# Patient Record
Sex: MALE | Race: WHITE | URBAN - METROPOLITAN AREA
[De-identification: names, ages, dates, MRNs, and addresses within clinical notes are randomized per-mention and may not be internally consistent; named-entity substitution may affect disease eponyms.]

---

## 2018-08-23 PROBLEM — G20 PARKINSON DISEASE (HCC): Status: ACTIVE | Noted: 2018-08-23

## 2019-02-26 ENCOUNTER — PATIENT OUTREACH (OUTPATIENT)
Dept: CASE MANAGEMENT | Age: 74
End: 2019-02-26

## 2019-02-26 NOTE — PROGRESS NOTES
Pt has a diagnoses of Parkinson's disease. Verbalized that he has been following up with his neurologist often. Upon visiting with his neurologist his HR was recorded in the low 40's. Pt had a pacemaker placed and is currently doing well. Lives at home with wife who is very involved in pts care. Son is a nurse at [employer] and is very involved in pts care. Pt had not seen his PCP in 7 months. Nurse educate on the importance of seeing PCP within 7 days after discharge. Nurse encourage pt to call PCP and set appointment. Pt verbalized that he will set appointment with PCP after phone call. Family continues to transport to all appointment as needed. Nurse will continue to monitor and provide care as needed.

## 2019-03-04 PROBLEM — I49.5 SINOATRIAL NODE DYSFUNCTION (HCC): Status: ACTIVE | Noted: 2019-03-04

## 2019-03-08 ENCOUNTER — PATIENT OUTREACH (OUTPATIENT)
Dept: CASE MANAGEMENT | Age: 74
End: 2019-03-08

## 2019-03-08 NOTE — PROGRESS NOTES
Spoke with pt. verbalized that he is doing well. verbalized that he saw his PCP on the 5th. Continues to follow up with Cardiologist. nurse educate on s/sx of MI. encourage to take nitroglycerin for CP. encourage to take 3 doses, and if no relief, notify 911. pt continues to follow with Neurologist for parkinson's. pt lives with spouse who is very involved in pts care. son transport to all appointment as needed. continues to take all med's as ordered. nurse will make this his last phone visit.